# Patient Record
Sex: MALE | Race: ASIAN | NOT HISPANIC OR LATINO | URBAN - METROPOLITAN AREA
[De-identification: names, ages, dates, MRNs, and addresses within clinical notes are randomized per-mention and may not be internally consistent; named-entity substitution may affect disease eponyms.]

---

## 2017-12-24 ENCOUNTER — EMERGENCY (EMERGENCY)
Facility: HOSPITAL | Age: 37
LOS: 1 days | Discharge: HOME | End: 2017-12-24
Attending: EMERGENCY MEDICINE | Admitting: EMERGENCY MEDICINE
Payer: COMMERCIAL

## 2017-12-24 VITALS
HEART RATE: 85 BPM | SYSTOLIC BLOOD PRESSURE: 125 MMHG | OXYGEN SATURATION: 98 % | DIASTOLIC BLOOD PRESSURE: 70 MMHG | RESPIRATION RATE: 18 BRPM | TEMPERATURE: 98 F

## 2017-12-24 LAB
ALBUMIN SERPL ELPH-MCNC: 4.7 G/DL — SIGNIFICANT CHANGE UP (ref 3.3–5)
ALP SERPL-CCNC: 101 U/L — SIGNIFICANT CHANGE UP (ref 40–120)
ALT FLD-CCNC: 77 U/L — HIGH (ref 4–41)
APPEARANCE UR: CLEAR — SIGNIFICANT CHANGE UP
AST SERPL-CCNC: 108 U/L — HIGH (ref 4–40)
BASE EXCESS BLDV CALC-SCNC: 1.1 MMOL/L — SIGNIFICANT CHANGE UP
BASOPHILS # BLD AUTO: 0.08 K/UL — SIGNIFICANT CHANGE UP (ref 0–0.2)
BASOPHILS NFR BLD AUTO: 1.1 % — SIGNIFICANT CHANGE UP (ref 0–2)
BILIRUB SERPL-MCNC: 0.6 MG/DL — SIGNIFICANT CHANGE UP (ref 0.2–1.2)
BILIRUB UR-MCNC: NEGATIVE — SIGNIFICANT CHANGE UP
BLOOD GAS VENOUS - CREATININE: 1.02 MG/DL — SIGNIFICANT CHANGE UP (ref 0.5–1.3)
BLOOD UR QL VISUAL: NEGATIVE — SIGNIFICANT CHANGE UP
BUN SERPL-MCNC: 9 MG/DL — SIGNIFICANT CHANGE UP (ref 7–23)
CALCIUM SERPL-MCNC: 9.1 MG/DL — SIGNIFICANT CHANGE UP (ref 8.4–10.5)
CHLORIDE BLDV-SCNC: 105 MMOL/L — SIGNIFICANT CHANGE UP (ref 96–108)
CHLORIDE SERPL-SCNC: 104 MMOL/L — SIGNIFICANT CHANGE UP (ref 98–107)
CO2 SERPL-SCNC: 25 MMOL/L — SIGNIFICANT CHANGE UP (ref 22–31)
COLOR SPEC: SIGNIFICANT CHANGE UP
CREAT SERPL-MCNC: 1.03 MG/DL — SIGNIFICANT CHANGE UP (ref 0.5–1.3)
EOSINOPHIL # BLD AUTO: 0.42 K/UL — SIGNIFICANT CHANGE UP (ref 0–0.5)
EOSINOPHIL NFR BLD AUTO: 5.8 % — SIGNIFICANT CHANGE UP (ref 0–6)
GAS PNL BLDV: 140 MMOL/L — SIGNIFICANT CHANGE UP (ref 136–146)
GLUCOSE BLDV-MCNC: 99 — SIGNIFICANT CHANGE UP (ref 70–99)
GLUCOSE SERPL-MCNC: 96 MG/DL — SIGNIFICANT CHANGE UP (ref 70–99)
GLUCOSE UR-MCNC: NEGATIVE — SIGNIFICANT CHANGE UP
HCO3 BLDV-SCNC: 24 MMOL/L — SIGNIFICANT CHANGE UP (ref 20–27)
HCT VFR BLD CALC: 47.1 % — SIGNIFICANT CHANGE UP (ref 39–50)
HCT VFR BLDV CALC: 48.5 % — SIGNIFICANT CHANGE UP (ref 39–51)
HGB BLD-MCNC: 15.3 G/DL — SIGNIFICANT CHANGE UP (ref 13–17)
HGB BLDV-MCNC: 15.8 G/DL — SIGNIFICANT CHANGE UP (ref 13–17)
IMM GRANULOCYTES # BLD AUTO: 0.03 # — SIGNIFICANT CHANGE UP
IMM GRANULOCYTES NFR BLD AUTO: 0.4 % — SIGNIFICANT CHANGE UP (ref 0–1.5)
KETONES UR-MCNC: NEGATIVE — SIGNIFICANT CHANGE UP
LACTATE BLDV-MCNC: 1.3 MMOL/L — SIGNIFICANT CHANGE UP (ref 0.5–2)
LEUKOCYTE ESTERASE UR-ACNC: NEGATIVE — SIGNIFICANT CHANGE UP
LIDOCAIN IGE QN: 22.9 U/L — SIGNIFICANT CHANGE UP (ref 7–60)
LYMPHOCYTES # BLD AUTO: 2.22 K/UL — SIGNIFICANT CHANGE UP (ref 1–3.3)
LYMPHOCYTES # BLD AUTO: 30.5 % — SIGNIFICANT CHANGE UP (ref 13–44)
MCHC RBC-ENTMCNC: 28.1 PG — SIGNIFICANT CHANGE UP (ref 27–34)
MCHC RBC-ENTMCNC: 32.5 % — SIGNIFICANT CHANGE UP (ref 32–36)
MCV RBC AUTO: 86.4 FL — SIGNIFICANT CHANGE UP (ref 80–100)
MONOCYTES # BLD AUTO: 0.67 K/UL — SIGNIFICANT CHANGE UP (ref 0–0.9)
MONOCYTES NFR BLD AUTO: 9.2 % — SIGNIFICANT CHANGE UP (ref 2–14)
MUCOUS THREADS # UR AUTO: SIGNIFICANT CHANGE UP
NEUTROPHILS # BLD AUTO: 3.85 K/UL — SIGNIFICANT CHANGE UP (ref 1.8–7.4)
NEUTROPHILS NFR BLD AUTO: 53 % — SIGNIFICANT CHANGE UP (ref 43–77)
NITRITE UR-MCNC: NEGATIVE — SIGNIFICANT CHANGE UP
NRBC # FLD: 0 — SIGNIFICANT CHANGE UP
PCO2 BLDV: 46 MMHG — SIGNIFICANT CHANGE UP (ref 41–51)
PH BLDV: 7.37 PH — SIGNIFICANT CHANGE UP (ref 7.32–7.43)
PH UR: 6.5 — SIGNIFICANT CHANGE UP (ref 4.6–8)
PLATELET # BLD AUTO: 288 K/UL — SIGNIFICANT CHANGE UP (ref 150–400)
PMV BLD: 10 FL — SIGNIFICANT CHANGE UP (ref 7–13)
PO2 BLDV: 38 MMHG — SIGNIFICANT CHANGE UP (ref 35–40)
POTASSIUM BLDV-SCNC: 3.8 MMOL/L — SIGNIFICANT CHANGE UP (ref 3.4–4.5)
POTASSIUM SERPL-MCNC: 4 MMOL/L — SIGNIFICANT CHANGE UP (ref 3.5–5.3)
POTASSIUM SERPL-SCNC: 4 MMOL/L — SIGNIFICANT CHANGE UP (ref 3.5–5.3)
PROT SERPL-MCNC: 7.6 G/DL — SIGNIFICANT CHANGE UP (ref 6–8.3)
PROT UR-MCNC: NEGATIVE MG/DL — SIGNIFICANT CHANGE UP
RBC # BLD: 5.45 M/UL — SIGNIFICANT CHANGE UP (ref 4.2–5.8)
RBC # FLD: 13.2 % — SIGNIFICANT CHANGE UP (ref 10.3–14.5)
SAO2 % BLDV: 64.5 % — SIGNIFICANT CHANGE UP (ref 60–85)
SODIUM SERPL-SCNC: 142 MMOL/L — SIGNIFICANT CHANGE UP (ref 135–145)
SP GR SPEC: 1 — LOW (ref 1–1.04)
UROBILINOGEN FLD QL: NORMAL MG/DL — SIGNIFICANT CHANGE UP
WBC # BLD: 7.27 K/UL — SIGNIFICANT CHANGE UP (ref 3.8–10.5)
WBC # FLD AUTO: 7.27 K/UL — SIGNIFICANT CHANGE UP (ref 3.8–10.5)

## 2017-12-24 PROCEDURE — 99284 EMERGENCY DEPT VISIT MOD MDM: CPT | Mod: GC

## 2017-12-24 RX ORDER — AZITHROMYCIN 500 MG/1
1000 TABLET, FILM COATED ORAL ONCE
Qty: 0 | Refills: 0 | Status: COMPLETED | OUTPATIENT
Start: 2017-12-24 | End: 2017-12-24

## 2017-12-24 RX ORDER — IBUPROFEN 200 MG
600 TABLET ORAL ONCE
Qty: 0 | Refills: 0 | Status: COMPLETED | OUTPATIENT
Start: 2017-12-24 | End: 2017-12-24

## 2017-12-24 RX ORDER — CEFTRIAXONE 500 MG/1
250 INJECTION, POWDER, FOR SOLUTION INTRAMUSCULAR; INTRAVENOUS ONCE
Qty: 0 | Refills: 0 | Status: COMPLETED | OUTPATIENT
Start: 2017-12-24 | End: 2017-12-24

## 2017-12-24 RX ADMIN — Medication 600 MILLIGRAM(S): at 10:07

## 2017-12-24 RX ADMIN — CEFTRIAXONE 250 MILLIGRAM(S): 500 INJECTION, POWDER, FOR SOLUTION INTRAMUSCULAR; INTRAVENOUS at 11:12

## 2017-12-24 RX ADMIN — AZITHROMYCIN 1000 MILLIGRAM(S): 500 TABLET, FILM COATED ORAL at 11:12

## 2017-12-24 NOTE — ED PROVIDER NOTE - OBJECTIVE STATEMENT
Pt used [employer] [facility] [ID#: [id number]]  38y/o M with no pertinent PMHx presents to the ED c/o urinary frequency, sharp bilateral flank pain and lower abd pain, and chills x9-12mo. His flank pain and abdominal pain is intermittent, only before and during urination. Pt reports he urinates "14 times in an hour." He says he "only pees a small amount" each instance of urination, describing it as a "trickle." Pt visited a urologist 1y ago and was given 2 medications, is unsure of which ones. He then visited the same urologist 1mo ago, was given 2 new medications that did not work. He then visited the urologist a third time 1w ago, had an XR, was told he had an infection and was given 2 more medications. He was told he had a "prostate infection" 4-5mo ago. Pt presents to the ED because recently he has been unable to sleep due to the frequency and he "does not like his urologist." Denies fevers, nausea/vomiting/diarrhea, cough, SOB, chest pain, constipation, malodorous urine, any other complaints. NKDA.

## 2017-12-24 NOTE — ED PROVIDER NOTE - CARE PLAN
Principal Discharge DX:	Urinary frequency  Instructions for follow-up, activity and diet:	1) Please follow-up with your primary care doctor within the next 3 days.  Please call today or tomorrow for an appointment.  If you cannot follow-up with your doctor(s), please return to the ED for any urgent issues.  2) If you have any worsening of symptoms or any other concerns please return to the ED immediately.  3) Please continue taking your home medications as directed.  4) You may have been given a copy of your labs and/or imaging.  Please go over these with your primary care doctor.

## 2017-12-24 NOTE — ED PROVIDER NOTE - MEDICAL DECISION MAKING DETAILS
38y/o M with long-standing urinary frequency, flank pain, and abdominal pain x1y with benign abdominal exam. Pt is poor historian as per  phone. Plan: Will check labs, urine cx, pain control, reassess.

## 2017-12-24 NOTE — ED ADULT TRIAGE NOTE - CHIEF COMPLAINT QUOTE
p/t c/o of diffuse abd pain and back pain for one year, and dysuria for past few days denies any nausea or vomiting nad noted

## 2017-12-24 NOTE — ED ADULT NURSE NOTE - OBJECTIVE STATEMENT
Pt presents to intake room 6, A&OX3, ambulatory at baseline without assistance, coming in for evaluation of urinary urgency, bilateral flank pain, and lower abdominal pain. symptoms have been present x 1 year. denies any chills, fevers, shortness of breath, dizziness, nausea or vomiting. IV established in right ac with a 20g, labs drawn and sent, call bell in reach, side rails up, bed in locked position, md evaluation in progress, will continue to monitor.

## 2017-12-24 NOTE — ED PROVIDER NOTE - CHPI ED SYMPTOMS NEG
no nausea/no fever/no diarrhea/no constipation, no cough, no chest pain, no malodorous urine/no vomiting

## 2017-12-24 NOTE — ED PROVIDER NOTE - PROGRESS NOTE DETAILS
Maxine Leigh MD PGY1: The scribe's documentation has been prepared under my direction and personally reviewed by me in its entirety. I confirm that the note above accurately reflects all work, treatment, procedures, and medical decision making performed by me. Pt very poor historian even with . Labs unremarkable. Pt reports no hx of STDs but unknown what workup was done by urology. With hx of proctitis, will treat for gc/chlamydia with f/u to urology and GI. Pt denies any current pain. Discussed f/u with GI and given f/u list.

## 2017-12-24 NOTE — ED PROVIDER NOTE - ATTENDING CONTRIBUTION TO CARE
Pt was seen and evaluated by me. Pt states over the past year Pt was seen and evaluated by me. Pt states over the past year having generalized abd pain. Pt denies any fever, chills, nausea, vomiting, SOB, chest pain, diarrhea, or discharge. Pt states he was seen by a Urologist 3 times over the past few months and has been on antibiotics for prostatitis. Pt denies any testicular pain or discharge. Lungs CTA b/l. RRR. Abd soft, non-tender. No inguinal tenderness or LAD.

## 2018-01-05 ENCOUNTER — APPOINTMENT (OUTPATIENT)
Dept: UROLOGY | Facility: CLINIC | Age: 38
End: 2018-01-05

## 2018-01-05 PROBLEM — Z00.00 ENCOUNTER FOR PREVENTIVE HEALTH EXAMINATION: Status: ACTIVE | Noted: 2018-01-05

## 2018-07-27 ENCOUNTER — APPOINTMENT (OUTPATIENT)
Dept: UROLOGY | Facility: CLINIC | Age: 38
End: 2018-07-27

## 2018-08-10 ENCOUNTER — APPOINTMENT (OUTPATIENT)
Dept: UROLOGY | Facility: CLINIC | Age: 38
End: 2018-08-10
Payer: MEDICAID

## 2018-08-10 VITALS
WEIGHT: 135 LBS | RESPIRATION RATE: 17 BRPM | TEMPERATURE: 98 F | HEIGHT: 66 IN | SYSTOLIC BLOOD PRESSURE: 121 MMHG | HEART RATE: 72 BPM | DIASTOLIC BLOOD PRESSURE: 80 MMHG | BODY MASS INDEX: 21.69 KG/M2

## 2018-08-10 DIAGNOSIS — Z80.0 FAMILY HISTORY OF MALIGNANT NEOPLASM OF DIGESTIVE ORGANS: ICD-10-CM

## 2018-08-10 DIAGNOSIS — Z83.3 FAMILY HISTORY OF DIABETES MELLITUS: ICD-10-CM

## 2018-08-10 DIAGNOSIS — R31.29 OTHER MICROSCOPIC HEMATURIA: ICD-10-CM

## 2018-08-10 DIAGNOSIS — Z87.891 PERSONAL HISTORY OF NICOTINE DEPENDENCE: ICD-10-CM

## 2018-08-10 LAB
BILIRUB UR QL STRIP: NEGATIVE
CLARITY UR: CLEAR
COLLECTION METHOD: NORMAL
GLUCOSE UR-MCNC: NEGATIVE
HCG UR QL: 0.2 EU/DL
HGB UR QL STRIP.AUTO: NORMAL
KETONES UR-MCNC: NEGATIVE
LEUKOCYTE ESTERASE UR QL STRIP: NEGATIVE
NITRITE UR QL STRIP: NEGATIVE
PH UR STRIP: 6.5
PROT UR STRIP-MCNC: NEGATIVE
SP GR UR STRIP: 1.01

## 2018-08-10 PROCEDURE — 99204 OFFICE O/P NEW MOD 45 MIN: CPT

## 2018-08-13 LAB
APPEARANCE: CLEAR
BACTERIA UR CULT: NORMAL
BACTERIA: NEGATIVE
BILIRUBIN URINE: NEGATIVE
BLOOD URINE: NEGATIVE
COLOR: YELLOW
GLUCOSE QUALITATIVE U: NEGATIVE MG/DL
KETONES URINE: NEGATIVE
LEUKOCYTE ESTERASE URINE: NEGATIVE
MICROSCOPIC-UA: NORMAL
NITRITE URINE: NEGATIVE
PH URINE: 6.5
PROTEIN URINE: NEGATIVE MG/DL
RED BLOOD CELLS URINE: 1 /HPF
SPECIFIC GRAVITY URINE: 1.01
SQUAMOUS EPITHELIAL CELLS: 0 /HPF
UROBILINOGEN URINE: NEGATIVE MG/DL
WHITE BLOOD CELLS URINE: 0 /HPF

## 2018-08-14 LAB — CORE LAB FLUID CYTOLOGY: NORMAL

## 2018-08-31 ENCOUNTER — CLINICAL ADVICE (OUTPATIENT)
Age: 38
End: 2018-08-31

## 2018-11-16 ENCOUNTER — APPOINTMENT (OUTPATIENT)
Dept: UROLOGY | Facility: CLINIC | Age: 38
End: 2018-11-16

## 2018-12-07 ENCOUNTER — APPOINTMENT (OUTPATIENT)
Dept: UROLOGY | Facility: CLINIC | Age: 38
End: 2018-12-07
Payer: COMMERCIAL

## 2018-12-07 PROCEDURE — 99213 OFFICE O/P EST LOW 20 MIN: CPT

## 2018-12-07 RX ORDER — TAMSULOSIN HYDROCHLORIDE 0.4 MG/1
0.4 CAPSULE ORAL
Qty: 60 | Refills: 6 | Status: ACTIVE | COMMUNITY
Start: 1900-01-01 | End: 1900-01-01

## 2019-01-17 ENCOUNTER — APPOINTMENT (OUTPATIENT)
Dept: UROLOGY | Facility: CLINIC | Age: 39
End: 2019-01-17

## 2019-01-31 ENCOUNTER — MEDICATION RENEWAL (OUTPATIENT)
Age: 39
End: 2019-01-31

## 2019-02-13 ENCOUNTER — EMERGENCY (EMERGENCY)
Facility: HOSPITAL | Age: 39
LOS: 1 days | Discharge: HOME | End: 2019-02-13
Admitting: EMERGENCY MEDICINE
Payer: MEDICAID

## 2019-02-13 VITALS
TEMPERATURE: 98 F | HEART RATE: 68 BPM | SYSTOLIC BLOOD PRESSURE: 108 MMHG | DIASTOLIC BLOOD PRESSURE: 69 MMHG | OXYGEN SATURATION: 100 % | RESPIRATION RATE: 16 BRPM

## 2019-02-13 LAB
ALBUMIN SERPL ELPH-MCNC: 4.7 G/DL — SIGNIFICANT CHANGE UP (ref 3.3–5)
ALP SERPL-CCNC: 101 U/L — SIGNIFICANT CHANGE UP (ref 40–120)
ALT FLD-CCNC: 70 U/L — HIGH (ref 4–41)
ANION GAP SERPL CALC-SCNC: 12 MMO/L — SIGNIFICANT CHANGE UP (ref 7–14)
APTT BLD: 32.3 SEC — SIGNIFICANT CHANGE UP (ref 27.5–36.3)
AST SERPL-CCNC: 29 U/L — SIGNIFICANT CHANGE UP (ref 4–40)
BASOPHILS # BLD AUTO: 0.05 K/UL — SIGNIFICANT CHANGE UP (ref 0–0.2)
BASOPHILS NFR BLD AUTO: 0.7 % — SIGNIFICANT CHANGE UP (ref 0–2)
BILIRUB SERPL-MCNC: 0.6 MG/DL — SIGNIFICANT CHANGE UP (ref 0.2–1.2)
BUN SERPL-MCNC: 11 MG/DL — SIGNIFICANT CHANGE UP (ref 7–23)
CALCIUM SERPL-MCNC: 9.1 MG/DL — SIGNIFICANT CHANGE UP (ref 8.4–10.5)
CHLORIDE SERPL-SCNC: 102 MMOL/L — SIGNIFICANT CHANGE UP (ref 98–107)
CO2 SERPL-SCNC: 25 MMOL/L — SIGNIFICANT CHANGE UP (ref 22–31)
CREAT SERPL-MCNC: 1.03 MG/DL — SIGNIFICANT CHANGE UP (ref 0.5–1.3)
EOSINOPHIL # BLD AUTO: 0.29 K/UL — SIGNIFICANT CHANGE UP (ref 0–0.5)
EOSINOPHIL NFR BLD AUTO: 4.1 % — SIGNIFICANT CHANGE UP (ref 0–6)
GLUCOSE SERPL-MCNC: 86 MG/DL — SIGNIFICANT CHANGE UP (ref 70–99)
HCT VFR BLD CALC: 46.3 % — SIGNIFICANT CHANGE UP (ref 39–50)
HGB BLD-MCNC: 15.1 G/DL — SIGNIFICANT CHANGE UP (ref 13–17)
IMM GRANULOCYTES NFR BLD AUTO: 0.3 % — SIGNIFICANT CHANGE UP (ref 0–1.5)
INR BLD: 0.97 — SIGNIFICANT CHANGE UP (ref 0.88–1.17)
LYMPHOCYTES # BLD AUTO: 2.16 K/UL — SIGNIFICANT CHANGE UP (ref 1–3.3)
LYMPHOCYTES # BLD AUTO: 30.6 % — SIGNIFICANT CHANGE UP (ref 13–44)
MCHC RBC-ENTMCNC: 27.4 PG — SIGNIFICANT CHANGE UP (ref 27–34)
MCHC RBC-ENTMCNC: 32.6 % — SIGNIFICANT CHANGE UP (ref 32–36)
MCV RBC AUTO: 84 FL — SIGNIFICANT CHANGE UP (ref 80–100)
MONOCYTES # BLD AUTO: 0.47 K/UL — SIGNIFICANT CHANGE UP (ref 0–0.9)
MONOCYTES NFR BLD AUTO: 6.6 % — SIGNIFICANT CHANGE UP (ref 2–14)
NEUTROPHILS # BLD AUTO: 4.08 K/UL — SIGNIFICANT CHANGE UP (ref 1.8–7.4)
NEUTROPHILS NFR BLD AUTO: 57.7 % — SIGNIFICANT CHANGE UP (ref 43–77)
NRBC # FLD: 0 K/UL — LOW (ref 25–125)
PLATELET # BLD AUTO: 269 K/UL — SIGNIFICANT CHANGE UP (ref 150–400)
PMV BLD: 9.6 FL — SIGNIFICANT CHANGE UP (ref 7–13)
POTASSIUM SERPL-MCNC: 3.9 MMOL/L — SIGNIFICANT CHANGE UP (ref 3.5–5.3)
POTASSIUM SERPL-SCNC: 3.9 MMOL/L — SIGNIFICANT CHANGE UP (ref 3.5–5.3)
PROT SERPL-MCNC: 7.2 G/DL — SIGNIFICANT CHANGE UP (ref 6–8.3)
PROTHROM AB SERPL-ACNC: 11.1 SEC — SIGNIFICANT CHANGE UP (ref 9.8–13.1)
RBC # BLD: 5.51 M/UL — SIGNIFICANT CHANGE UP (ref 4.2–5.8)
RBC # FLD: 12.9 % — SIGNIFICANT CHANGE UP (ref 10.3–14.5)
SODIUM SERPL-SCNC: 139 MMOL/L — SIGNIFICANT CHANGE UP (ref 135–145)
TROPONIN T, HIGH SENSITIVITY: < 6 NG/L — SIGNIFICANT CHANGE UP (ref ?–14)
WBC # BLD: 7.07 K/UL — SIGNIFICANT CHANGE UP (ref 3.8–10.5)
WBC # FLD AUTO: 7.07 K/UL — SIGNIFICANT CHANGE UP (ref 3.8–10.5)

## 2019-02-13 PROCEDURE — 99284 EMERGENCY DEPT VISIT MOD MDM: CPT

## 2019-02-13 PROCEDURE — 71046 X-RAY EXAM CHEST 2 VIEWS: CPT | Mod: 26

## 2019-02-13 NOTE — ED PROVIDER NOTE - NSFOLLOWUPINSTRUCTIONS_ED_ALL_ED_FT
FOLLOW UP WITH YOUR PRIMARY MEDICAL DOCTOR IN 2-3 DAYS OR WITHIN THE WEEK. SHOW COPIES OF YOUR RESULTS/REPORTS GIVEN TO YOU.  Take all of your other medications as previously prescribed. Worsening or continued chest pain, shortness of breath, weakness, return to ED.  Take Motrin 600mg every 8hrs with food for pain

## 2019-02-13 NOTE — ED PROVIDER NOTE - CARE PLAN
Principal Discharge DX:	Chest pain  Assessment and plan of treatment:	FOLLOW UP WITH YOUR PRIMARY MEDICAL DOCTOR IN 2-3 DAYS OR WITHIN THE WEEK. SHOW COPIES OF YOUR RESULTS/REPORTS GIVEN TO YOU.  Take all of your other medications as previously prescribed. Worsening or continued chest pain, shortness of breath, weakness, return to ED.

## 2019-02-13 NOTE — ED PROVIDER NOTE - CLINICAL SUMMARY MEDICAL DECISION MAKING FREE TEXT BOX
Chest pain, likely msk, costochondritis.  PLan labs, trop x 1. EKG wnl/ NSR, no ischemic changes. Cxr.

## 2019-02-13 NOTE — ED ADULT NURSE NOTE - OBJECTIVE STATEMENT
39M presents with chest pain starting 2 weeks ago, states the pain is sharp, non provoked, no alleviating factors, last 5 minutes at a time. Pt states he had no pain 2 days ago, but yesterday he pain on and off all day. Pt denies SOB, n/v/d. Resp even and unlabored. No distress. No c/o current pain.

## 2019-02-13 NOTE — ED PROVIDER NOTE - PLAN OF CARE
FOLLOW UP WITH YOUR PRIMARY MEDICAL DOCTOR IN 2-3 DAYS OR WITHIN THE WEEK. SHOW COPIES OF YOUR RESULTS/REPORTS GIVEN TO YOU.  Take all of your other medications as previously prescribed. Worsening or continued chest pain, shortness of breath, weakness, return to ED.

## 2019-02-13 NOTE — ED PROVIDER NOTE - OBJECTIVE STATEMENT
40 yo M with PMHX of ?BPH presents to the ED with c/o intermittent, random, non exertional, non pleuritic, left side, but sometimes on right side chest pain but doesn't radiate to back or arms. Had pain last 2 days ago, no current pain. Pain started 2 weeks ago, went to PMD was prescribe antihistamines with some relief Denies cough, fever, chills, nausea, vomiting, abdominal pain.   Denies leg swelling, PE/ DVT history, recent travel.

## 2019-03-08 ENCOUNTER — APPOINTMENT (OUTPATIENT)
Dept: UROLOGY | Facility: CLINIC | Age: 39
End: 2019-03-08
Payer: COMMERCIAL

## 2019-03-08 PROCEDURE — 99213 OFFICE O/P EST LOW 20 MIN: CPT

## 2019-03-08 RX ORDER — SILODOSIN 8 MG/1
8 CAPSULE ORAL DAILY
Qty: 30 | Refills: 3 | Status: ACTIVE | COMMUNITY
Start: 2019-03-08 | End: 1900-01-01

## 2019-03-12 RX ORDER — ALFUZOSIN HYDROCHLORIDE 10 MG/1
10 TABLET, EXTENDED RELEASE ORAL
Qty: 30 | Refills: 0 | Status: DISCONTINUED | COMMUNITY
Start: 2019-03-12 | End: 2019-03-12

## 2019-03-21 ENCOUNTER — APPOINTMENT (OUTPATIENT)
Dept: UROLOGY | Facility: CLINIC | Age: 39
End: 2019-03-21
Payer: COMMERCIAL

## 2019-03-21 ENCOUNTER — OUTPATIENT (OUTPATIENT)
Dept: OUTPATIENT SERVICES | Facility: HOSPITAL | Age: 39
LOS: 1 days | End: 2019-03-21
Payer: MEDICAID

## 2019-03-21 VITALS — TEMPERATURE: 98.3 F | DIASTOLIC BLOOD PRESSURE: 86 MMHG | HEART RATE: 78 BPM | SYSTOLIC BLOOD PRESSURE: 124 MMHG

## 2019-03-21 DIAGNOSIS — R35.0 FREQUENCY OF MICTURITION: ICD-10-CM

## 2019-03-21 PROCEDURE — 51797 INTRAABDOMINAL PRESSURE TEST: CPT | Mod: 26

## 2019-03-21 PROCEDURE — 52000 CYSTOURETHROSCOPY: CPT

## 2019-03-21 PROCEDURE — 51728 CYSTOMETROGRAM W/VP: CPT

## 2019-03-21 PROCEDURE — 51784 ANAL/URINARY MUSCLE STUDY: CPT | Mod: 26

## 2019-03-21 PROCEDURE — 51797 INTRAABDOMINAL PRESSURE TEST: CPT

## 2019-03-21 PROCEDURE — 51728 CYSTOMETROGRAM W/VP: CPT | Mod: 26

## 2019-03-21 PROCEDURE — 51741 ELECTRO-UROFLOWMETRY FIRST: CPT | Mod: 26

## 2019-03-21 PROCEDURE — 51784 ANAL/URINARY MUSCLE STUDY: CPT

## 2019-03-21 PROCEDURE — 51741 ELECTRO-UROFLOWMETRY FIRST: CPT

## 2019-03-22 DIAGNOSIS — R35.1 NOCTURIA: ICD-10-CM

## 2019-03-22 DIAGNOSIS — R39.12 POOR URINARY STREAM: ICD-10-CM

## 2019-04-05 ENCOUNTER — APPOINTMENT (OUTPATIENT)
Dept: UROLOGY | Facility: CLINIC | Age: 39
End: 2019-04-05

## 2019-04-19 ENCOUNTER — APPOINTMENT (OUTPATIENT)
Dept: UROLOGY | Facility: CLINIC | Age: 39
End: 2019-04-19
Payer: COMMERCIAL

## 2019-04-19 PROCEDURE — 99214 OFFICE O/P EST MOD 30 MIN: CPT

## 2019-05-06 ENCOUNTER — OTHER (OUTPATIENT)
Age: 39
End: 2019-05-06

## 2019-05-23 ENCOUNTER — FORM ENCOUNTER (OUTPATIENT)
Age: 39
End: 2019-05-23

## 2019-05-24 ENCOUNTER — OUTPATIENT (OUTPATIENT)
Dept: OUTPATIENT SERVICES | Facility: HOSPITAL | Age: 39
LOS: 1 days | End: 2019-05-24
Payer: MEDICAID

## 2019-05-24 ENCOUNTER — APPOINTMENT (OUTPATIENT)
Dept: MRI IMAGING | Facility: IMAGING CENTER | Age: 39
End: 2019-05-24
Payer: COMMERCIAL

## 2019-05-24 DIAGNOSIS — R35.0 FREQUENCY OF MICTURITION: ICD-10-CM

## 2019-05-24 PROCEDURE — 72148 MRI LUMBAR SPINE W/O DYE: CPT

## 2019-05-24 PROCEDURE — 72148 MRI LUMBAR SPINE W/O DYE: CPT | Mod: 26

## 2019-06-07 ENCOUNTER — APPOINTMENT (OUTPATIENT)
Dept: UROLOGY | Facility: CLINIC | Age: 39
End: 2019-06-07
Payer: COMMERCIAL

## 2019-06-07 PROCEDURE — 99213 OFFICE O/P EST LOW 20 MIN: CPT

## 2019-06-17 ENCOUNTER — APPOINTMENT (OUTPATIENT)
Dept: SPINE | Facility: CLINIC | Age: 39
End: 2019-06-17
Payer: COMMERCIAL

## 2019-06-17 VITALS
HEIGHT: 66 IN | DIASTOLIC BLOOD PRESSURE: 81 MMHG | RESPIRATION RATE: 17 BRPM | HEART RATE: 72 BPM | WEIGHT: 154 LBS | TEMPERATURE: 97.7 F | BODY MASS INDEX: 24.75 KG/M2 | SYSTOLIC BLOOD PRESSURE: 118 MMHG

## 2019-06-17 DIAGNOSIS — M54.5 LOW BACK PAIN: ICD-10-CM

## 2019-06-17 DIAGNOSIS — F17.200 NICOTINE DEPENDENCE, UNSPECIFIED, UNCOMPLICATED: ICD-10-CM

## 2019-06-17 PROCEDURE — 99203 OFFICE O/P NEW LOW 30 MIN: CPT

## 2019-06-17 NOTE — ASSESSMENT
[FreeTextEntry1] : Urinary incontinence not related to foraminal disc herniation at L3-4. Normal neurological exam. No need for any neurosurgical intervention. Recommend followup with urology.

## 2019-06-17 NOTE — CONSULT LETTER
[Dear  ___] : Dear  [unfilled], [Sincerely,] : Sincerely, [DrChelsea  ___] : Dr. CRUZ [Consult Letter:] : I had the pleasure of evaluating your patient, [unfilled]. [Consult Closing:] : Thank you very much for allowing me to participate in the care of this patient.  If you have any questions, please do not hesitate to contact me. [FreeTextEntry2] : Shay Fitch MD\par [address]\[place] [address]\par  [FreeTextEntry3] : Emre Arreaga MD\par [address]\par [address] \[place] [address]

## 2019-06-17 NOTE — HISTORY OF PRESENT ILLNESS
[Unknown] : unknown [de-identified] : 39-year-old gentleman who is being worked up for urinary incontinence. He does not have any back pain. He does not have any numbness, tingling or weakness in his legs. An MRI report of the lumbar spine describes a foraminal disc L3-4. There is no significant central stenosis or cauda equina impingement on this report. [FreeTextEntry1] : urinary incontinence

## 2019-06-17 NOTE — REASON FOR VISIT
[New Patient Visit] : a new patient visit [Referred By: _________] : Patient was referred by NANCY [FreeTextEntry1] : Patient had a MRI lumbar for evaluation of  urinary retention. Dr Garber has sent the patient for neurosurgical evaluation. Patient denies back pain, weakness or paresthesias

## 2019-06-17 NOTE — PHYSICAL EXAM
[Person] : oriented to person [Place] : oriented to place [Time] : oriented to time [Short Term Intact] : short term memory intact [Remote Intact] : remote memory intact [Span Intact] : the attention span was normal [Fluency] : fluency intact [Concentration Intact] : normal concentrating ability [Comprehension] : comprehension intact [Current Events] : adequate knowledge of current events [Past History] : adequate knowledge of personal past history [Vocabulary] : adequate range of vocabulary [Cranial Nerves Optic (II)] : visual acuity intact bilaterally,  pupils equal round and reactive to light [Cranial Nerves Oculomotor (III)] : extraocular motion intact [Cranial Nerves Vestibulocochlear (VIII)] : hearing was intact bilaterally [Cranial Nerves Trigeminal (V)] : facial sensation intact symmetrically [Cranial Nerves Facial (VII)] : face symmetrical [Cranial Nerves Accessory (XI - Cranial And Spinal)] : head turning and shoulder shrug symmetric [Cranial Nerves Glossopharyngeal (IX)] : tongue and palate midline [No Muscle Atrophy] : normal bulk in all four extremities [Motor Strength] : muscle strength was normal in all four extremities [Motor Tone] : muscle tone was normal in all four extremities [Cranial Nerves Hypoglossal (XII)] : there was no tongue deviation with protrusion [Abnormal Walk] : normal gait [Sensation Tactile Decrease] : light touch was intact [Balance] : balance was intact [2+] : Ankle jerk left 2+ [No Tenderness to Palpation] : no spine tenderness on palpation [No Pain with ROM] : no pain with motion in any direction [Able to toe walk] : the patient was able to toe walk [Able to heel walk] : the patient was able to heel walk [Past-pointing] : there was no past-pointing [Tremor] : no tremor present [Plantar Reflex Left Only] : normal on the left [Plantar Reflex Right Only] : normal on the right [Aguilar] : Aguilar's sign was not demonstrated [Straight-Leg Raise Test - Right] : straight leg raise  of the right leg was negative [Straight-Leg Raise Test - Left] : straight leg raise of the left leg was negative

## 2019-06-21 ENCOUNTER — APPOINTMENT (OUTPATIENT)
Dept: UROLOGY | Facility: CLINIC | Age: 39
End: 2019-06-21
Payer: COMMERCIAL

## 2019-06-21 DIAGNOSIS — R39.12 POOR URINARY STREAM: ICD-10-CM

## 2019-06-21 PROCEDURE — 99213 OFFICE O/P EST LOW 20 MIN: CPT

## 2019-06-24 ENCOUNTER — APPOINTMENT (OUTPATIENT)
Dept: SPINE | Facility: CLINIC | Age: 39
End: 2019-06-24

## 2019-06-24 LAB — BACTERIA UR CULT: NORMAL

## 2019-06-28 ENCOUNTER — MEDICATION RENEWAL (OUTPATIENT)
Age: 39
End: 2019-06-28

## 2019-07-12 ENCOUNTER — APPOINTMENT (OUTPATIENT)
Dept: UROLOGY | Facility: CLINIC | Age: 39
End: 2019-07-12
Payer: COMMERCIAL

## 2019-07-12 DIAGNOSIS — R35.1 NOCTURIA: ICD-10-CM

## 2019-07-12 DIAGNOSIS — R35.0 FREQUENCY OF MICTURITION: ICD-10-CM

## 2019-07-12 PROCEDURE — 99213 OFFICE O/P EST LOW 20 MIN: CPT

## 2019-07-12 RX ORDER — MIRABEGRON 25 MG/1
25 TABLET, FILM COATED, EXTENDED RELEASE ORAL
Qty: 90 | Refills: 3 | Status: ACTIVE | COMMUNITY
Start: 2018-08-10 | End: 1900-01-01

## 2019-07-12 NOTE — HISTORY OF PRESENT ILLNESS
[FreeTextEntry1] : 40 yo gentleman seen 3 weeks ago for dysfunctional voiding and OAB. He has tried and failed alpha-blockers and anticholinergics. He was referred to PT, will be seeing July 17th. He made appt today to ask for mirabegron again, believing it did actually help him.

## 2019-07-12 NOTE — ASSESSMENT
[FreeTextEntry1] : \par \par Impression/plan: 40 yo gentleman seen 3 weeks ago for dysfunctional voiding and OAB. He has tried and failed alpha-blockers and anticholinergics. He was referred to PT, will be seeing July 17th. He made appt today to ask for mirabegron again, believing it did actually help him. Med ordered. He will start PT. F/u in 3-6 months.

## 2019-07-12 NOTE — PHYSICAL EXAM
[General Appearance - Well Developed] : well developed [General Appearance - Well Nourished] : well nourished [Normal Appearance] : normal appearance [Well Groomed] : well groomed [General Appearance - In No Acute Distress] : no acute distress [Abdomen Soft] : soft [Abdomen Tenderness] : non-tender [Costovertebral Angle Tenderness] : no ~M costovertebral angle tenderness [Respiration, Rhythm And Depth] : normal respiratory rhythm and effort [Edema] : no peripheral edema [] : no respiratory distress [Exaggerated Use Of Accessory Muscles For Inspiration] : no accessory muscle use [Normal Station and Gait] : the gait and station were normal for the patient's age [Oriented To Time, Place, And Person] : oriented to person, place, and time [No Focal Deficits] : no focal deficits

## 2019-11-09 ENCOUNTER — EMERGENCY (EMERGENCY)
Facility: HOSPITAL | Age: 39
LOS: 1 days | Discharge: HOME | End: 2019-11-09
Attending: EMERGENCY MEDICINE | Admitting: EMERGENCY MEDICINE
Payer: MEDICAID

## 2019-11-09 VITALS
OXYGEN SATURATION: 100 % | HEART RATE: 73 BPM | TEMPERATURE: 98 F | DIASTOLIC BLOOD PRESSURE: 91 MMHG | SYSTOLIC BLOOD PRESSURE: 136 MMHG | RESPIRATION RATE: 18 BRPM

## 2019-11-09 LAB

## 2019-11-09 PROCEDURE — 99283 EMERGENCY DEPT VISIT LOW MDM: CPT

## 2019-11-09 NOTE — ED PROVIDER NOTE - PATIENT PORTAL LINK FT
You can access the FollowMyHealth Patient Portal offered by [facility] by registering at the following website: [email or web address]. By joining [employer]’s FollowMyHealth portal, you will also be able to view your health information using other applications (apps) compatible with our system.

## 2019-11-09 NOTE — ED PROVIDER NOTE - NSFOLLOWUPINSTRUCTIONS_ED_ALL_ED_FT
As we discussed the discomfort is likely just from having the procedure done yesterday. You need to follow up with your urologist as soon as possible. If you have any severe increase in pain, fever, uncontrollable nausea vomiting, or inability to tolerate eating and drinking you need to come right back to the emergency room.

## 2019-11-09 NOTE — ED PROVIDER NOTE - ATTENDING CONTRIBUTION TO CARE
agree with resident note    "39 Y M with no known medical hx states has overactive bladder has been here before with similar complaints says that he has been seeing a urologist and they do not know what is causing his symptoms. He says that yesterday he had the urologist do a "camera test" and now after he finishes urinating he has some pain."  States initially mild hematuria but now resolved    PE: well appearing; VSS: CTAB/L; s1 s2 no m/r/g abd soft/NT/ND ext: no edema

## 2019-11-09 NOTE — ED PROVIDER NOTE - CLINICAL SUMMARY MEDICAL DECISION MAKING FREE TEXT BOX
Diagnosis: Fall (on)(from) escalator, subsequent encounter (Q47.0DAG)  Acute bilateral low back pain with left-sided sciatica (M54.42)          [facility]its[facility]         Next MD visit: none scheduled  Fall Risk: standard         Precautions: n patient response today's treatment. Plan: Patient to see MD tomorrow. Patient has 2 more appointments scheduled.     Charges: EX 3    Total Timed Treatment: 45 min  Total Treatment Time: 45 min 39 Y M with hx of overactive bladder has been seeing urology but they are unsure of the diagnosis, had a cystoscopy yesterday and now having pain after he finishes urinating. Will get UA if normal will DC with uro follow up, no symptoms here of severe infection, obstruction or hematuria.

## 2019-11-09 NOTE — ED ADULT NURSE NOTE - OBJECTIVE STATEMENT
Pt received in spot 11,  A&OX3, ambulatory at baseline without assistance, coming in for penile discomfort, s/p cytoscopy today, denies any chills, fevers, shortness of breath, dizziness, nausea or vomiting. Urinalysis and urine culture sent to lab. HE is currently sitting in chair, calm and cooperative. MD evaluation in progress, will continue to monitor.

## 2019-11-09 NOTE — ED ADULT NURSE NOTE - ED STAT RN HANDOFF DETAILS
Symptoms and results explained to patient. Patient agrees with discharge, left in stable condition, will follow up with his urologist.

## 2019-11-09 NOTE — ED ADULT NURSE NOTE - CHIEF COMPLAINT QUOTE
Pt states after urinating he experiences burning sensations in the penis for about 2-3 minutes. Pt states he saw a urologist yesterday and "had a test" and afterwards is when said symptoms started.  Pt states hx of overactive bladder.

## 2019-11-09 NOTE — ED PROVIDER NOTE - OBJECTIVE STATEMENT
39 Y M with no known medical hx states has overactive bladder has been here before with similar complaints says that he has been seeing a urologist and they do not know what is causing his symptoms. He says that yesterday he had the urologist do a "camera test" and now after he finishes urinating he has some pain. He denies difficulty urinating, fever, nausea, vomiting, or back pain. He says that there is no blood in the urine. He says that he is going to follow up with the urologist next week. He also thinks that he is supposed to start a medication but doesn't know what it is or what it is for.

## 2019-11-09 NOTE — ED ADULT TRIAGE NOTE - CHIEF COMPLAINT QUOTE
Pt states after urinating he experiences burning sensations and pain in the penis for about 2-3 minutes. Pt states he saw a urologist yesterday and "had a test" and afterwards is when said symptoms started. Pt states after urinating he experiences burning sensations in the penis for about 2-3 minutes. Pt states he saw a urologist yesterday and "had a test" and afterwards is when said symptoms started.  Pt states hx of overactive bladder.

## 2019-11-10 LAB
BACTERIA UR CULT: SIGNIFICANT CHANGE UP
SPECIMEN SOURCE: SIGNIFICANT CHANGE UP

## 2024-01-03 NOTE — ED ADULT NURSE NOTE - CCCP TRG CHIEF CMPLNT
Urinary Symptoms
work    Escalation of care including admission/observation considered:pending    Joce Rodriguez Jr. is a 42 y.o. male who presents for hallucinations, paranoia and suicidal thoughts.  He admits to using cocaine and amphetamines.  Denies homicidal ideation.    Laboratory evaluation shows a slight leukopenia at 4.2, CMP is fairly unremarkable, urinalysis unremarkable, serum drug screen is negative, urine drug screen positive for cocaine.  Patient is medically cleared for social work evaluation    The patient has been medically cleared for any acute or serious medical emergency. Therefore,  the patient is medically stable for inpatient psychiatric care.      CONSULTS:   IP CONSULT TO SOCIAL WORK        PROCEDURES   Unless otherwise noted below, none       CRITICAL CARE TIME (.cct)   na        I am the Primary Clinician of Record.    FINAL IMPRESSION      1. Polysubstance abuse (HCC)    2. Paranoia (HCC)    3. Suicidal ideation          DISPOSITION/PLAN     DISPOSITION        PATIENT REFERRED TO:  No follow-up provider specified.    DISCHARGE MEDICATIONS:  New Prescriptions    No medications on file       DISCONTINUED MEDICATIONS:  Discontinued Medications    No medications on file              (Please note that portions of this note were completed with a voice recognition program.  Efforts were made to edit the dictations but occasionally words are mis-transcribed.)    Lauryn Chamorro DO (electronically signed)          Lauryn Chamorro DO  01/02/24 [id number]

## 2024-04-01 NOTE — ED ADULT NURSE NOTE - CAS DISCH TRANSFER METHOD
Jasmin fierro, is requesting refills for Lansoprazole 15 mg capsule   [facility]  
Pt is calling is requesting talk to you please call pt call cell [phone]  
Walking

## 2024-11-25 NOTE — ED ADULT NURSE NOTE - CHPI ED NUR TIMING2
Left message for patient to call the office to schedule appointment to review labs and restart diabetic medication.   none